# Patient Record
Sex: MALE | Race: BLACK OR AFRICAN AMERICAN | NOT HISPANIC OR LATINO
[De-identification: names, ages, dates, MRNs, and addresses within clinical notes are randomized per-mention and may not be internally consistent; named-entity substitution may affect disease eponyms.]

---

## 2019-08-16 PROBLEM — Z00.00 ENCOUNTER FOR PREVENTIVE HEALTH EXAMINATION: Status: ACTIVE | Noted: 2019-08-16

## 2019-09-09 ENCOUNTER — APPOINTMENT (OUTPATIENT)
Dept: NEUROSURGERY | Facility: CLINIC | Age: 64
End: 2019-09-09
Payer: OTHER MISCELLANEOUS

## 2019-09-09 ENCOUNTER — APPOINTMENT (OUTPATIENT)
Dept: NEUROSURGERY | Facility: CLINIC | Age: 64
End: 2019-09-09

## 2019-09-09 VITALS — HEIGHT: 71 IN | BODY MASS INDEX: 29.46 KG/M2 | WEIGHT: 210.44 LBS

## 2019-09-09 DIAGNOSIS — M54.2 CERVICALGIA: ICD-10-CM

## 2019-09-09 PROCEDURE — 99204 OFFICE O/P NEW MOD 45 MIN: CPT

## 2019-09-11 PROBLEM — M54.2 CERVICALGIA: Status: ACTIVE | Noted: 2019-09-11

## 2019-09-11 NOTE — PLAN
[FreeTextEntry1] : At this time Mr. Campa most significant complaint is regarding the right lumbar spine pain and spasm. It is limiting his mobility. While he has been participating in physical therapy I have encouraged him to do those exercises/stretching daily at home on his own as this will be very beneficial for his lumbago. I do no think surgery is necessary at this time. He will follow up as needed.

## 2019-09-11 NOTE — HISTORY OF PRESENT ILLNESS
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 He also has some neck pain. The pain occasionally radiates into the leg to the foot but for the majority of the time he experiences a constant stabbing pain in the right side of the low back. He denies any true weakness, bowel or bladder dysfunction. Despite being out of work since the accident the pain has not abated. MRI of the cervical and lumbar spine were reviewed. While he does have multi-level degenerative changes noted in both with some degree of bilateral multi-level foraminal stenosis, no significant central stenosis is noted.

## 2020-01-27 ENCOUNTER — APPOINTMENT (OUTPATIENT)
Dept: NEUROSURGERY | Facility: CLINIC | Age: 65
End: 2020-01-27

## 2020-03-23 ENCOUNTER — APPOINTMENT (OUTPATIENT)
Dept: NEUROSURGERY | Facility: CLINIC | Age: 65
End: 2020-03-23

## 2020-04-27 ENCOUNTER — APPOINTMENT (OUTPATIENT)
Dept: NEUROSURGERY | Facility: CLINIC | Age: 65
End: 2020-04-27

## 2020-05-18 ENCOUNTER — APPOINTMENT (OUTPATIENT)
Dept: NEUROSURGERY | Facility: CLINIC | Age: 65
End: 2020-05-18
Payer: OTHER MISCELLANEOUS

## 2020-05-18 DIAGNOSIS — M54.5 LOW BACK PAIN: ICD-10-CM

## 2020-05-18 PROCEDURE — G2012 BRIEF CHECK IN BY MD/QHP: CPT

## 2020-05-19 PROBLEM — M54.5 LUMBAGO: Status: ACTIVE | Noted: 2019-09-11

## 2020-05-19 NOTE — HISTORY OF PRESENT ILLNESS
[Verbal consent obtained from patient] : the patient, [unfilled] [FreeTextEntry1] : i am seeing Mr. Krishnamurthy in follow up. he does not have access to technology needed for video telehealth. mr. krishnamurthy has suffered significant left back pain radiating into the buttock since his injury. His pain was significantly helped with Pt and chiropractic care. In the last few months he has had worsening of his pain syndrome. He know has pain radiating into the left back and buttock but also now significant pain in the right back radiating into the back of the leg. he has known significant stenosis on MRI which was done two years ago. i am concerned he has had progression of this stenosis given new symptoms. he has not been able to continue PT as Workers comp has denied him. We have discussed surgical intervention in the past, however, given improvement with PT it is reasonable to continue that less invasive modality. he denies bowel or bladder dysfunction. he can only stand/walk short time/distance before he needs to rest due to pain and heaviness in the legs c/w claudication.

## 2020-08-26 ENCOUNTER — APPOINTMENT (OUTPATIENT)
Dept: NEUROSURGERY | Facility: CLINIC | Age: 65
End: 2020-08-26

## 2020-10-07 ENCOUNTER — APPOINTMENT (OUTPATIENT)
Dept: NEUROSURGERY | Facility: CLINIC | Age: 65
End: 2020-10-07

## 2020-10-14 ENCOUNTER — APPOINTMENT (OUTPATIENT)
Dept: NEUROSURGERY | Facility: CLINIC | Age: 65
End: 2020-10-14
Payer: OTHER MISCELLANEOUS

## 2020-10-14 VITALS — WEIGHT: 239 LBS | HEIGHT: 71 IN | BODY MASS INDEX: 33.46 KG/M2

## 2020-10-14 VITALS — HEIGHT: 71 IN | WEIGHT: 239 LBS | BODY MASS INDEX: 33.46 KG/M2

## 2020-10-14 DIAGNOSIS — Z78.9 OTHER SPECIFIED HEALTH STATUS: ICD-10-CM

## 2020-10-14 PROCEDURE — 99213 OFFICE O/P EST LOW 20 MIN: CPT

## 2020-10-14 NOTE — ASSESSMENT
[FreeTextEntry1] : Mr. Campa will  a new CD and the report of his recent MRI performed at [facility]. I will see him back on Monday 10/19/2020 at 10am to discuss treatment options. Patient is agreeable. \par \par Case and plan discussed with Dr. Mujica today.

## 2020-10-14 NOTE — PHYSICAL EXAM
[FreeTextEntry1] : Constitutional: Well appearing, no distress\par HEENT: Normocephalic Atraumatic\par Psychiatric: Alert and oriented to all spheres, normal mood\par Pulmonary: no respiratory distress\par Abdomen: non-distended\par Vascular/Extremities: no edema, no cyanosis, no clubbing\par \par \par Neurologic: \par CN II-XII grossly intact\par ROM: decreased in LS spine due to pain/spasm\par Palpation: significant pain to palpation right lumbar paraspinous muscles, multiple spasm points appreciated\par Strength: Full strength in all major muscle groups, no atrophy\par Sensation: Full sensation to light touch in all extremities\par Reflexes: \par  2+ patellar\par  2+ biceps\par  2+ ankle jerk\par  No Ron's\par  No clonus\par  No babinski\par \par Signs:\par SLR negative\par L'hermitte's negative\par \par Gait: toe, heel, tandem fluid

## 2020-10-14 NOTE — HISTORY OF PRESENT ILLNESS
[FreeTextEntry1] : Mr. Campa was involved in a work related injury on 3/11/2018. He continues to have persistent lower back pain with referred pain down the legs. He has had physical therapy which provides relief however treatments were stopped in August to the lack of authorization from  to proceed. He is under the care of Dr. Henry and Dr. Quiroz for ESIs and medical management. He is currently taking Fenoprofen Calcium 400 mg PRN. He had an updated MRI of the lumbar spine however, the report is not available for my review today and the CD Mr. Campa brought today was unable to be accessed. No bowel / bladder dysfunction.

## 2020-10-26 ENCOUNTER — APPOINTMENT (OUTPATIENT)
Dept: NEUROSURGERY | Facility: CLINIC | Age: 65
End: 2020-10-26
Payer: OTHER MISCELLANEOUS

## 2020-10-26 VITALS — BODY MASS INDEX: 33.46 KG/M2 | HEIGHT: 71 IN | WEIGHT: 239 LBS

## 2020-10-26 PROCEDURE — 99072 ADDL SUPL MATRL&STAF TM PHE: CPT

## 2020-10-26 PROCEDURE — 99214 OFFICE O/P EST MOD 30 MIN: CPT

## 2020-10-26 NOTE — HISTORY OF PRESENT ILLNESS
[FreeTextEntry1] : Mr. Campa was involved in a work related injury on 3/11/2018. He continues to have persistent lower back pain with intermittent pain into the legs. He has had physical therapy which provides relief however treatments were stopped in August to the lack of authorization from  to proceed. He recently started to workout at the gym again which has helped him. He is also working on weight reduction. No bowel / bladder dysfunction. Dr. Henry and Dr. Quiroz have given him TPIs with temporary relief. He is currently taking Fenoprofen Calcium 400 mg PRN. \par \par We have reviewed his recent MRI of the lumbar spine today. This was performed at [facility]. He has stenosis at L4/5 secondary to degenerative disc bulging / facet hypertrophy. \par \par  \par

## 2020-10-26 NOTE — ASSESSMENT
[FreeTextEntry1] : We had a thorough discussion regarding his condition and treatment options. I have recommended he trial L4/5 SUZANNE and Facet/MB block possible RFA for his pain. I will see him back in 8 weeks. If his pain continues despite conservative treatments then xrays with dynamic views will be ordered. He is agreeable.

## 2020-10-26 NOTE — PHYSICAL EXAM
[FreeTextEntry1] : Constitutional: Well appearing, no distress\par HEENT: Normocephalic Atraumatic\par Psychiatric: Alert and oriented to all spheres, normal mood\par Pulmonary: no respiratory distress\par Abdomen: non-distended\par Vascular/Extremities: no edema, no cyanosis, no clubbing\par \par \par Neurologic: \par CN II-XII grossly intact\par ROM: decreased in LS spine due to pain/spasm\par Palpation: significant pain to palpation right lumbar paraspinous muscles, multiple spasm points appreciated\par Strength: Full strength in all major muscle groups, no atrophy\par Sensation: Full sensation to light touch in all extremities\par Reflexes: \par  2+ patellar\par  2+ biceps\par  2+ ankle jerk\par  No Ron's\par  No clonus\par  No babinski\par \par Signs:\par SLR negative\par L'hermitte's negative\par \par Gait: toe, heel, tandem fluid. \par

## 2020-12-10 ENCOUNTER — APPOINTMENT (OUTPATIENT)
Dept: NEUROSURGERY | Facility: CLINIC | Age: 65
End: 2020-12-10

## 2021-01-25 ENCOUNTER — APPOINTMENT (OUTPATIENT)
Dept: NEUROSURGERY | Facility: CLINIC | Age: 66
End: 2021-01-25
Payer: OTHER MISCELLANEOUS

## 2021-01-25 VITALS — WEIGHT: 240 LBS | BODY MASS INDEX: 33.6 KG/M2 | HEIGHT: 71 IN

## 2021-01-25 VITALS — WEIGHT: 245 LBS | BODY MASS INDEX: 34.17 KG/M2

## 2021-01-25 PROCEDURE — 99214 OFFICE O/P EST MOD 30 MIN: CPT

## 2021-01-25 PROCEDURE — 99072 ADDL SUPL MATRL&STAF TM PHE: CPT

## 2021-01-25 NOTE — ASSESSMENT
[FreeTextEntry1] : We had a thorough discussion regarding his condition and treatment options. Last visit we discussed consulting with pain management again for additional injections. He would now like to proceed with an L4/5 SUZANNE and Facet/MB block possible RFA for his pain. He will also continue with the gym and weight reduction. I will see him back in 8 weeks. If his pain continues despite conservative treatments then xrays with dynamic views will be ordered. He is agreeable. \par

## 2021-01-25 NOTE — PHYSICAL EXAM
[FreeTextEntry1] : \par Constitutional: Well appearing, no distress\par HEENT: Normocephalic Atraumatic\par Psychiatric: Alert and oriented to all spheres, normal mood\par Pulmonary: no respiratory distress\par Abdomen: non-distended\par Vascular/Extremities: no edema, no cyanosis, no clubbing\par \par \par Neurologic: \par CN II-XII grossly intact\par ROM: decreased in LS spine due to pain/spasm\par Palpation: significant pain to palpation right lumbar paraspinous muscles, multiple spasm points appreciated\par Strength: Full strength in all major muscle groups, no atrophy\par Sensation: Full sensation to light touch in all extremities\par Reflexes: \par  2+ patellar\par  2+ biceps\par  2+ ankle jerk\par  No Ron's\par  No clonus\par  No babinski\par \par Signs:\par SLR negative\par L'hermitte's negative\par \par Gait: toe, heel, tandem fluid.

## 2021-01-25 NOTE — HISTORY OF PRESENT ILLNESS
[FreeTextEntry1] : Mr. Campa was involved in a work related injury on 3/11/2018. He continues to have persistent lower back pain with intermittent pain into the legs. He has had physical therapy which provides relief however treatments were stopped in August to the lack of authorization from  to proceed. He recently started to workout at the gym again. He is also working on weight reduction. No bowel / bladder dysfunction. Dr. Henry and Dr. Quiroz have given him TPIs with temporary relief. He is currently taking Fenoprofen Calcium 400 mg PRN. \par \par We have reviewed his recent MRI of the lumbar spine today. This was performed at [facility]. He has stenosis at L4/5 secondary to degenerative disc bulging / facet hypertrophy. \par \par  \par

## 2021-04-08 ENCOUNTER — APPOINTMENT (OUTPATIENT)
Dept: NEUROSURGERY | Facility: CLINIC | Age: 66
End: 2021-04-08
Payer: OTHER MISCELLANEOUS

## 2021-04-08 VITALS — WEIGHT: 240 LBS | BODY MASS INDEX: 33.6 KG/M2 | HEIGHT: 71 IN

## 2021-04-08 PROCEDURE — 99214 OFFICE O/P EST MOD 30 MIN: CPT

## 2021-04-08 PROCEDURE — 99072 ADDL SUPL MATRL&STAF TM PHE: CPT

## 2021-04-08 RX ORDER — HYDROCHLOROTHIAZIDE 12.5 MG/1
TABLET ORAL
Refills: 0 | Status: ACTIVE | COMMUNITY

## 2021-04-08 NOTE — PHYSICAL EXAM
[FreeTextEntry1] : Constitutional: Well appearing, no distress\par HEENT: Normocephalic Atraumatic\par Psychiatric: Alert and oriented to all spheres, normal mood\par Pulmonary: no respiratory distress\par Abdomen: non-distended\par Vascular/Extremities: no edema, no cyanosis, no clubbing\par \par Neurologic: \par CN II-XII grossly intact\par ROM: mild restriction. \par Palpation: no pain. \par Strength: Full strength in all major muscle groups, no atrophy\par Sensation: Full sensation to light touch in all extremities\par Reflexes: \par  2+ patellar\par  2+ biceps\par  2+ ankle jerk\par  No Ron's\par  No clonus\par  No babinski\par \par Signs:\par SLR negative\par L'hermitte's negative\par \par Gait: toe, heel, tandem fluid.

## 2021-04-08 NOTE — ASSESSMENT
[FreeTextEntry1] : Mr. Campa will continue with conservative management. I will see him back in 3 months. He will follow up with Dr. Henry as scheduled. He will continue to work on weight reduction and will start a home exercise program.

## 2021-04-08 NOTE — HISTORY OF PRESENT ILLNESS
[FreeTextEntry1] : Mr. Campa was involved in a work related injury on 3/11/2018. After the injury he developed lower back pain with intermittent pain into the legs. He had physical therapy and TPIs with some relief. Since his last visit he saw Dr. Henry and had an SUZANNE which has provided sustained relief. He is taking Fenoprofen PRN which has also helped. He saw his cardiologist who did a stress test and has cleared him to work out again. Weight reduction was encouraged and he has already lost 5 lbs. His pain scale today is a 2/10. He is happy with his progress. \par \par - MRI of the lumbar spine, [facility]: stenosis at L4/5 secondary to degenerative disc bulging / facet hypertrophy. \par

## 2021-07-26 ENCOUNTER — OUTPATIENT (OUTPATIENT)
Dept: OUTPATIENT SERVICES | Facility: HOSPITAL | Age: 66
LOS: 1 days | Discharge: HOME | End: 2021-07-26
Payer: MEDICARE

## 2021-07-26 DIAGNOSIS — E04.1 NONTOXIC SINGLE THYROID NODULE: ICD-10-CM

## 2021-07-26 PROCEDURE — 71046 X-RAY EXAM CHEST 2 VIEWS: CPT | Mod: 26

## 2021-07-26 PROCEDURE — 76536 US EXAM OF HEAD AND NECK: CPT | Mod: 26

## 2021-09-16 ENCOUNTER — APPOINTMENT (OUTPATIENT)
Dept: ENDOCRINOLOGY | Facility: CLINIC | Age: 66
End: 2021-09-16

## 2022-06-06 ENCOUNTER — APPOINTMENT (OUTPATIENT)
Age: 67
End: 2022-06-06
Payer: MEDICARE

## 2022-06-06 ENCOUNTER — NON-APPOINTMENT (OUTPATIENT)
Age: 67
End: 2022-06-06

## 2022-06-06 VITALS
OXYGEN SATURATION: 95 % | DIASTOLIC BLOOD PRESSURE: 60 MMHG | RESPIRATION RATE: 12 BRPM | HEIGHT: 71 IN | WEIGHT: 238 LBS | BODY MASS INDEX: 33.32 KG/M2 | SYSTOLIC BLOOD PRESSURE: 120 MMHG | HEART RATE: 81 BPM

## 2022-06-06 DIAGNOSIS — Z86.39 PERSONAL HISTORY OF OTHER ENDOCRINE, NUTRITIONAL AND METABOLIC DISEASE: ICD-10-CM

## 2022-06-06 DIAGNOSIS — J92.9 PLEURAL PLAQUE W/OUT ASBESTOS: ICD-10-CM

## 2022-06-06 DIAGNOSIS — F17.210 NICOTINE DEPENDENCE, CIGARETTES, UNCOMPLICATED: ICD-10-CM

## 2022-06-06 DIAGNOSIS — Z86.59 PERSONAL HISTORY OF OTHER MENTAL AND BEHAVIORAL DISORDERS: ICD-10-CM

## 2022-06-06 DIAGNOSIS — Z86.79 PERSONAL HISTORY OF OTHER DISEASES OF THE CIRCULATORY SYSTEM: ICD-10-CM

## 2022-06-06 DIAGNOSIS — J44.9 CHRONIC OBSTRUCTIVE PULMONARY DISEASE, UNSPECIFIED: ICD-10-CM

## 2022-06-06 DIAGNOSIS — G47.33 OBSTRUCTIVE SLEEP APNEA (ADULT) (PEDIATRIC): ICD-10-CM

## 2022-06-06 DIAGNOSIS — E11.9 TYPE 2 DIABETES MELLITUS W/OUT COMPLICATIONS: ICD-10-CM

## 2022-06-06 PROCEDURE — 99203 OFFICE O/P NEW LOW 30 MIN: CPT | Mod: 25

## 2022-06-06 PROCEDURE — 71046 X-RAY EXAM CHEST 2 VIEWS: CPT

## 2022-06-06 NOTE — DISCUSSION/SUMMARY
[FreeTextEntry1] : BIBASILAR PLEURAL THICKENING\par SMOKER\par PFT\par HIGHLY SUNITHA\par WEIGHT LOSS\par SMOKING COUNSELING\par TO CALL ME FOR RESULT OF CT

## 2022-06-06 NOTE — HISTORY OF PRESENT ILLNESS
[TextBox_4] : 67 YEARS OLD PRESENTED FOR ABOVE, SMOKER NOW 1 CIG/ DAY, HAD CHEST CT SCREENING FEW MONTH AGO, BIBASILAR PLEURAL THICKENING, REFERRED, NO IF OF LUNG DISEASE, SOB ON EXERTION, COUGH, NO RECURRENT BRONCHITIS\par EDS, ? SNORING\par

## 2022-09-21 ENCOUNTER — APPOINTMENT (OUTPATIENT)
Dept: HEMATOLOGY ONCOLOGY | Facility: CLINIC | Age: 67
End: 2022-09-21

## 2022-09-21 VITALS
WEIGHT: 253 LBS | HEIGHT: 71 IN | HEART RATE: 85 BPM | BODY MASS INDEX: 35.42 KG/M2 | DIASTOLIC BLOOD PRESSURE: 93 MMHG | TEMPERATURE: 97.3 F | RESPIRATION RATE: 20 BRPM | SYSTOLIC BLOOD PRESSURE: 173 MMHG

## 2022-10-18 ENCOUNTER — APPOINTMENT (OUTPATIENT)
Dept: PAIN MANAGEMENT | Facility: CLINIC | Age: 67
End: 2022-10-18

## 2022-10-18 VITALS — DIASTOLIC BLOOD PRESSURE: 89 MMHG | HEART RATE: 80 BPM | SYSTOLIC BLOOD PRESSURE: 156 MMHG

## 2022-10-18 DIAGNOSIS — Z72.0 TOBACCO USE: ICD-10-CM

## 2022-10-18 PROCEDURE — 99204 OFFICE O/P NEW MOD 45 MIN: CPT

## 2022-10-18 RX ORDER — ATORVASTATIN CALCIUM 10 MG/1
10 TABLET, FILM COATED ORAL
Refills: 0 | Status: ACTIVE | COMMUNITY

## 2022-10-18 RX ORDER — TAMSULOSIN HYDROCHLORIDE 0.4 MG/1
0.4 CAPSULE ORAL
Refills: 0 | Status: ACTIVE | COMMUNITY

## 2022-10-18 RX ORDER — FOSINOPRIL SODIUM 10 MG/1
10 TABLET ORAL
Refills: 0 | Status: ACTIVE | COMMUNITY

## 2022-10-19 NOTE — ASSESSMENT
[FreeTextEntry1] : This is a 67 year old male whose chief complaint is left lower back pain which was a result of a work related injury. There is no prior imaging for review today, therefore we will submit for a MRI lumbar spine as well as PT for his lumbar pain. In the meantime, I will prescibe Meloxicam 15mg once a day. We will follow up in 4 weeks to review imaging and for further evaluation.\par \par All this patients questions were answered and the conversation was understood well.\par \par Medication was prescibed today. \par \par Lumbar MRI with and without contrast was ordered to delineate spine pathology such as disc displacement and stenosis.\par \par Physical therapy of the lumbar spine 2-3 times a week for 4-8 weeks stressing a home exercise program of walking, shoulder girdle strengthening,  swimming, elliptical , recumbent bike, Amador chi and Yoga. Use things that heat like hot shower or icy heat before rehab, exercising and at the beginning of the day, and ice (ice in a bag never directly on the skin) after activity and at the end of the day.\par \par I, Rola Goldberg, attest that this documentation has been prepared under the direction and in the presence of Provider Penelope Mancilla MD.\par \par \par Thank you for allowing me to assist in the management of this patient. \par \par \par Best Regards, \par \par \par Penelope Mancilla M.D., FAAPMR\par \par \par Diplomate, American Board of Physical Medicine and Rehabilitation\par Diplomate, American Board of Pain Medicine \par Diplomate, American Board of Pain Management\par

## 2022-10-19 NOTE — DATA REVIEWED
[FreeTextEntry1] : SOAPP: \par \par UDS: No data obtained today\par \par [place] REGISTRY: Checked\par

## 2022-10-19 NOTE — PHYSICAL EXAM
[Normal Coordination] : normal coordination [Normal DTR UE/LE] : normal DTR UE/LE  [Normal Sensation] : normal sensation [Normal Mood and Affect] : normal mood and affect [Orientated] : orientated [Able to Communicate] : able to communicate [Well Developed] : well developed [Well Nourished] : well nourished [Flexion] : flexion [Extension] : extension [] : negative sitting straight leg raise [[id number]] : False [de-identified] : extension 0 degrees

## 2022-10-19 NOTE — HISTORY OF PRESENT ILLNESS
[FreeTextEntry1] : This is a 67-year-old male who presents today for a walk-in.  His chief complaint today is left lower back pain which was a result of a work related injury. 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 He was told he needed to rush and was provided no help. While attempting to unload and stack the skids, he states he felt a "pop" on the left side of his back. He was in so much pain that he fell to the side causing his left arm to hit into a pole. He did not seek immediate medical attention. He worked until June 4th, 2022, and he went to the ER at [facility] where he states he was treated and released and imaging was obtained which we do not have for our review. He also saw the doctor through his job, located in [place], a week after the incident again which we do not have for our review. He was not provided any medication and PT was denied by workers comp.  He denies any radicular symptoms.  Patient denies any prior history of lower back pain however he was seen by neurosurgery in the past for lower back pain.\par \par Patient rates his pain at a 7 1/2-8/10 today and states it is constant. He has had this pain for 5 months. He uses heat and cold treatments along with creams which provide him little relief. He denies any pain down the legs at this time. There is no prior imaging for review. He is interested in undergoing injections but was made aware that conservative treatments must be done first. \par

## 2022-10-19 NOTE — WORK
[Sprain/Strain] : sprain/strain [Was the competent medical cause of the injury] : was the competent medical cause of the injury [Are consistent with the injury] : are consistent with the injury [Consistent with my objective findings] : consistent with my objective findings [Total] : total [Cannot return to work because ________] : cannot return to work because [unfilled] [Unknown at this time] : : unknown at this time [Patient] : patient [No] : No [No Rx restrictions] : No Rx restrictions. [I provided the services listed above] :  I provided the services listed above. [Less than Sedentary Work:] :  Less than Sedentary Work: Unable to meet the requirement of Sedentary Work. [FreeTextEntry1] : good

## 2022-11-22 ENCOUNTER — APPOINTMENT (OUTPATIENT)
Dept: PAIN MANAGEMENT | Facility: CLINIC | Age: 67
End: 2022-11-22

## 2022-12-16 ENCOUNTER — APPOINTMENT (OUTPATIENT)
Dept: PAIN MANAGEMENT | Facility: CLINIC | Age: 67
End: 2022-12-16
Payer: SELF-PAY

## 2022-12-16 VITALS — HEIGHT: 71 IN | BODY MASS INDEX: 35.42 KG/M2 | WEIGHT: 253 LBS

## 2022-12-16 PROCEDURE — 99214 OFFICE O/P EST MOD 30 MIN: CPT

## 2022-12-19 NOTE — PHYSICAL EXAM
[Normal Coordination] : normal coordination [Normal DTR UE/LE] : normal DTR UE/LE  [Normal Sensation] : normal sensation [Normal Mood and Affect] : normal mood and affect [Orientated] : orientated [Able to Communicate] : able to communicate [Well Developed] : well developed [Well Nourished] : well nourished [Flexion] : flexion [Extension] : extension [] : negative sitting straight leg raise [de-identified] : extension 0 degrees

## 2022-12-19 NOTE — HISTORY OF PRESENT ILLNESS
[FreeTextEntry1] : ORIGINAL PRESENTATION: This is a 67-year-old male who presented to the walk-in on 10/18/22.  His chief complaint today is left lower back pain which was a result of a work related injury. 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 He was told he needed to rush and was provided no help. While attempting to unload and stack the skids, he states he felt a "pop" on the left side of his back. He was in so much pain that he fell to the side causing his left arm to hit into a pole. He did not seek immediate medical attention. He worked until June 4th, 2022, and he went to the ER at [facility] where he states he was treated and released and imaging was obtained which we do not have for our review. He also saw the doctor through his job, located in [place], a week after the incident again which we do not have for our review. He was not provided any medication and PT was denied by workers comp.  He denies any radicular symptoms.  Patient denies any prior history of lower back pain however he was seen by neurosurgery in the past for lower back pain.\par \par Patient rates his pain at a 7 1/2-8/10 today and states it is constant. He has had this pain for 5 months. He uses heat and cold treatments along with creams which provide him little relief. He denies any pain down the legs at this time. There is no prior imaging for review. He is interested in undergoing injections but was made aware that conservative treatments must be done first. \par \par The patient has had this pain for 6 months. The pain started after injury at work Patient describes pain as moderate to severe a nearly constant. During the last month the pain has been worse during the morning, evening. Pain described as burning, throbbing. Pain is decreased with sitting. Bowel or bladder habits have not changed.\par \par ACTIVITIES: Patient could walk less than 1 blocks before the pain starts. Patient can sit 3 hours before pain starts. The patient often lays down because of pain. Patient uses cane at this time. Patient has difficulty participating in recreational activities, exercise at this time.\par \par PRIOR PAIN TREATMENTS: Moderate relief with exercise, heat treatment, S psychotherapy.\par \par PRIOR PAIN MEDICATIONS: Meloxicam. \par \par PATIENT PRESENTS FOR FOLLOW UP: The reason for this visit is left lower back which was the result of a work related injury. Since his last visit, he completed an MRI of the lumbar spine which finds moderate central stenosis. He continues to experience pain in the left side of his back which is asymptomatic since there there are no radicular features noted. He states that on a good day he is able to stand for 1 hour but bad days only for 20 minutes. He is frustrated with the state of his pain because prior to the work incident he was able to be more physical. He has been going to the gym but has not started PT as of yet. He was made aware that his chronic arthritis was likely exacerbated by the injury he sustained at work.  \par \par Of note, he is also experiencing pain in the left arm that is a result of falling into a metal pole while stacking the skids.\par \par \par

## 2022-12-19 NOTE — DATA REVIEWED
[FreeTextEntry1] : MRI of the lumbar spine dated 11/25/22 finds Degenerative changes lumbar spine as described above level by level most significant at L4-5 where there is moderate central stenosis as well as moderate to severe bilateral facet arthropathy.  there is no evidence of severe central or severe foraminal narrowing throughout the lumbar spine levels. \par \par SOAPP: low on 12/16/22\par Low risk: Patient has combination of a low risk SOAP and no risk factors. UDS would be repeated randomly every quarter.\par \par UDS: No data obtained today\par \par [place] REGISTRY: Checked\par

## 2022-12-19 NOTE — ASSESSMENT
[FreeTextEntry1] : This is a 67 year old male whose chief complaint is left lower back pain which was a result of a work related injury. Patient underwent an MRI of the lumbar spine which was discussed in detial today. His chronic arthristis was likely exacerbated by the work injury. He is advised to begin trialing PT. He has been utilizing Meloxicam 15mg once a day which is providing him some relief. I will send a refill to the pharmacy and follow up in 6 weeks for see if the PT provides him with relief.. All this patients questions were answered and the conversation was understood well.\par \par Medication was refilled today. \par \par Physical therapy of the lumbar spine 2-3 times a week for 4-8 weeks stressing a home exercise program of walking, shoulder girdle strengthening,  swimming, elliptical , recumbent bike, Amador chi and Yoga. Use things that heat like hot shower or icy heat before rehab, exercising and at the beginning of the day, and ice (ice in a bag never directly on the skin) after activity and at the end of the day.\par \par \par I, Rola Goldberg, attest that this documentation has been prepared under the direction and in the presence of Provider Penelope Mancilla MD.\par \par \par Thank you for allowing me to assist in the management of this patient. \par \par \par Best Regards, \par \par \par Penelope Mancilla M.D., FAAPMR\par \par \par Diplomate, American Board of Physical Medicine and Rehabilitation\par Diplomate, American Board of Pain Medicine \par Diplomate, American Board of Pain Management\par

## 2022-12-20 ENCOUNTER — FORM ENCOUNTER (OUTPATIENT)
Age: 67
End: 2022-12-20

## 2023-01-19 ENCOUNTER — FORM ENCOUNTER (OUTPATIENT)
Age: 68
End: 2023-01-19

## 2023-02-03 ENCOUNTER — APPOINTMENT (OUTPATIENT)
Dept: PAIN MANAGEMENT | Facility: CLINIC | Age: 68
End: 2023-02-03
Payer: OTHER MISCELLANEOUS

## 2023-02-03 VITALS
HEART RATE: 80 BPM | HEIGHT: 71 IN | BODY MASS INDEX: 35.42 KG/M2 | SYSTOLIC BLOOD PRESSURE: 176 MMHG | WEIGHT: 253 LBS | DIASTOLIC BLOOD PRESSURE: 94 MMHG

## 2023-02-03 DIAGNOSIS — M54.50 LOW BACK PAIN, UNSPECIFIED: ICD-10-CM

## 2023-02-03 DIAGNOSIS — M46.97 UNSPECIFIED INFLAMMATORY SPONDYLOPATHY, LUMBOSACRAL REGION: ICD-10-CM

## 2023-02-03 PROCEDURE — 99214 OFFICE O/P EST MOD 30 MIN: CPT

## 2023-02-03 PROCEDURE — 99072 ADDL SUPL MATRL&STAF TM PHE: CPT

## 2023-02-03 NOTE — HISTORY OF PRESENT ILLNESS
[FreeTextEntry1] : ORIGINAL PRESENTATION: This is a 67-year-old male who presented to the walk-in on 10/18/22.  His chief complaint was of left lower back pain which was a result of a work related injury. 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 He was told he needed to rush and states he was provided no help. While attempting to unload and stack the skids, he states he felt a "pop" in the left side of his back. He was in so much pain that he fell to the side causing his left arm to hit into a pole. He did not seek immediate medical attention. He worked until June 4th, 2022, when he went to the ER at [facility] where he states he was treated and released and imaging was obtained which we do not have for our review. He also saw the doctor through his job, located in [place], a week after the incident again which we do not have for our review. He was not provided any medication and PT was denied by workers comp.  He denies any radicular symptoms.  Patient denies any prior history of lower back pain however he was seen by neurosurgery and pain management in the past for lower back pain which was as a result of a work injury in 2018.\par \par Patient rates his pain at a 7 1/2-8/10 today and states it is constant. He has had this pain for 5 months. He uses heat and cold treatments along with creams which provide him little relief. He denies any pain down the legs at this time. There is no prior imaging for review. He is interested in undergoing injections but was made aware that conservative treatments must be done first. \par \par The patient has had this pain for 6 months. The pain started after injury at work Patient describes pain as moderate to severe a nearly constant. During the last month the pain has been worse during the morning, evening. Pain described as burning, throbbing. Pain is decreased with sitting. Bowel or bladder habits have not changed.\par \par ACTIVITIES: Patient could walk less than 1 blocks before the pain starts. Patient can sit 3 hours before pain starts. The patient often lays down because of pain. Patient uses cane at this time. Patient has difficulty participating in recreational activities, exercise at this time.\par \par PRIOR PAIN TREATMENTS: Moderate relief with exercise, heat treatment, S psychotherapy.\par \par PRIOR PAIN MEDICATIONS: Meloxicam. \par \par PATIENT PRESENTS FOR FOLLOW UP: The reason for this visit is left lower back pain which was the result of a work-related injury. Since his last visit, patient reports an episode on Wednesday, 2/1/23, of what he believed to be a spasm. The pain started in the LT lower back, but he then began to feel a sharp pain radiate down his RT leg to the calf. Patient states episode lasted for 4 minutes, and he was unable to walk or use his leg for an additional 15 minutes. Patient has not begun PT as of yet because it was denied by his insurance. He states he is applying Icy Hot to the areas and using Epson salt baths to manage his symptoms. He has been doing home exercises. Patient has partial relief from the meloxicam.  PT once again denied by WC. He has not returned to work and states that he would not be able to lift at least 50 lbs. which is required at his job.\par \par

## 2023-02-03 NOTE — PHYSICAL EXAM
[Normal Coordination] : normal coordination [Normal DTR UE/LE] : normal DTR UE/LE  [Normal Sensation] : normal sensation [Normal Mood and Affect] : normal mood and affect [Orientated] : orientated [Able to Communicate] : able to communicate [Well Developed] : well developed [Well Nourished] : well nourished [Flexion] : flexion [Extension] : extension [] : negative sitting straight leg raise [TWNoteComboBox7] : forward flexion 45 degrees [de-identified] : extension 10 degrees

## 2023-02-03 NOTE — ASSESSMENT
[FreeTextEntry1] : This is a 67 year old male whose chief complaint is left lower back pain which was a result of a work related injury. Patient has not begun PT due to insurance denial but states he has been doing home exercise as conservative treatment. He will follow up with his  in regards to PT denial. He has been utilizing Meloxicam 15mg once a day which provides him some relief. I will send a refill to the pharmacy and follow up in 6 weeks for reassessment.  All this patients questions were answered and the conversation was understood well.\par \par Medication was refilled today. \par \par I, Andie Valenzuela, attest that this documentation has been prepared under the direction and in the presence of Provider Penelope Mancilla MD.\par \par \par Thank you for allowing me to assist in the management of this patient. \par \par \par Best Regards, \par \par \par Penelope Mancilla M.D., [facility]\par \par \par Diplomate, American Board of Physical Medicine and Rehabilitation\par Diplomate, American Board of Pain Medicine \par Diplomate, American Board of Pain Management\par

## 2023-02-03 NOTE — DATA REVIEWED
[FreeTextEntry1] : MRI of the lumbar spine dated 11/25/22 finds Degenerative changes lumbar spine as described above level by level most significant at L4-5 where there is moderate central stenosis as well as moderate to severe bilateral facet arthropathy.  there is no evidence of severe central or severe foraminal narrowing throughout the lumbar spine levels. \par \par SOAPP: low on 12/16/22\par Low risk: Patient has combination of a low risk SOAP and no risk factors. UDS would be repeated randomly every quarter.\par \par UDS: No data obtained today.\par \par [place] REGISTRY: Checked.\par

## 2023-03-03 ENCOUNTER — APPOINTMENT (OUTPATIENT)
Age: 68
End: 2023-03-03

## 2023-03-21 ENCOUNTER — APPOINTMENT (OUTPATIENT)
Dept: PAIN MANAGEMENT | Facility: CLINIC | Age: 68
End: 2023-03-21
Payer: OTHER MISCELLANEOUS

## 2023-03-21 VITALS
WEIGHT: 253 LBS | HEART RATE: 81 BPM | BODY MASS INDEX: 35.42 KG/M2 | HEIGHT: 71 IN | DIASTOLIC BLOOD PRESSURE: 92 MMHG | SYSTOLIC BLOOD PRESSURE: 138 MMHG

## 2023-03-21 VITALS — HEIGHT: 71 IN | BODY MASS INDEX: 35.42 KG/M2 | WEIGHT: 253 LBS

## 2023-03-21 DIAGNOSIS — M47.816 SPONDYLOSIS W/OUT MYELOPATHY OR RADICULOPATHY, LUMBAR REGION: ICD-10-CM

## 2023-03-21 DIAGNOSIS — M51.36 OTHER INTERVERTEBRAL DISC DEGENERATION, LUMBAR REGION: ICD-10-CM

## 2023-03-21 DIAGNOSIS — M48.061 SPINAL STENOSIS, LUMBAR REGION WITHOUT NEUROGENIC CLAUDICATION: ICD-10-CM

## 2023-03-21 PROCEDURE — 99213 OFFICE O/P EST LOW 20 MIN: CPT | Mod: ACP

## 2023-03-21 NOTE — ASSESSMENT
[FreeTextEntry1] : This is a 68 year old male whose chief complaint is left lower back pain which was a result of a work related injury. Patient has not begun PT due to insurance denial but states he has been doing home exercise as conservative treatment. He will follow up with his  in regards to PT denial. He has been utilizing Meloxicam 15mg once a day which provides him some relief. I will send a refill to the pharmacy and follow up in 8 weeks for reassessment.  All this patients questions were answered and the conversation was understood well.\par \par Medication was refilled today. \par \par Giacomo Guthrie PA-C\par Penelope Mancilla M.D., FAAPMR\par \par \par \par

## 2023-03-21 NOTE — WORK
[Sprain/Strain] : sprain/strain [Was the competent medical cause of the injury] : was the competent medical cause of the injury [Are consistent with the injury] : are consistent with the injury [Consistent with my objective findings] : consistent with my objective findings [Cannot return to work because ________] : cannot return to work because [unfilled] [Unknown at this time] : : unknown at this time [Patient] : patient [No] : No [No Rx restrictions] : No Rx restrictions. [I provided the services listed above] :  I provided the services listed above. [Less than Sedentary Work:] :  Less than Sedentary Work: Unable to meet the requirement of Sedentary Work. [FreeTextEntry1] : good

## 2023-03-21 NOTE — HISTORY OF PRESENT ILLNESS
[FreeTextEntry1] : ORIGINAL PRESENTATION: This is a 68-year-old male who presented to the walk-in on 10/18/22.  His chief complaint was of left lower back pain which was a result of a work related injury. 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 He was told he needed to rush and states he was provided no help. While attempting to unload and stack the skids, he states he felt a "pop" in the left side of his back. He was in so much pain that he fell to the side causing his left arm to hit into a pole. He did not seek immediate medical attention. He worked until June 4th, 2022, when he went to the ER at [facility] where he states he was treated and released and imaging was obtained which we do not have for our review. He also saw the doctor through his job, located in [place], a week after the incident again which we do not have for our review. He was not provided any medication and PT was denied by workers comp.  He denies any radicular symptoms.  Patient denies any prior history of lower back pain however he was seen by neurosurgery and pain management in the past for lower back pain which was as a result of a work injury in 2018.\par \par Patient rates his pain at a 7 1/2-8/10 today and states it is constant. He has had this pain for 5 months. He uses heat and cold treatments along with creams which provide him little relief. He denies any pain down the legs at this time. There is no prior imaging for review. He is interested in undergoing injections but was made aware that conservative treatments must be done first. \par \par The patient has had this pain for 6 months. The pain started after injury at work Patient describes pain as moderate to severe a nearly constant. During the last month the pain has been worse during the morning, evening. Pain described as burning, throbbing. Pain is decreased with sitting. Bowel or bladder habits have not changed.\par \par ACTIVITIES: Patient could walk less than 1 blocks before the pain starts. Patient can sit 3 hours before pain starts. The patient often lays down because of pain. Patient uses cane at this time. Patient has difficulty participating in recreational activities, exercise at this time.\par \par PRIOR PAIN TREATMENTS: Moderate relief with exercise, heat treatment, S psychotherapy.\par \par PRIOR PAIN MEDICATIONS: Meloxicam. \par \par PATIENT PRESENTS FOR FOLLOW UP: Last seen on 02/03/2023 and since then there has been no new complaints or acute changes.The reason for this visit is left lower back pain which was the result of a work-related injury. Since his last visit, patient reports an episode on Wednesday, 2/1/23, of what he believed to be a spasm. The pain started in the LT lower back, but he then began to feel a sharp pain radiate down his RT leg to the calf. Patient states episode lasted for 4 minutes, and he was unable to walk or use his leg for an additional 15 minutes. Patient has not begun PT as of yet because it was denied by his insurance. He states he is applying Icy Hot to the areas and using Epson salt baths to manage his symptoms. He has been doing home exercises. Patient has partial relief from the meloxicam.  PT once again denied by WC. He has not returned to work and states that he would not be able to lift at least 50 lbs. which is required at his job.\par \par

## 2023-03-21 NOTE — PHYSICAL EXAM
[Normal Coordination] : normal coordination [Normal DTR UE/LE] : normal DTR UE/LE  [Normal Sensation] : normal sensation [Normal Mood and Affect] : normal mood and affect [Orientated] : orientated [Able to Communicate] : able to communicate [Well Developed] : well developed [Well Nourished] : well nourished [Extension] : extension [Flexion] : flexion [] : negative sitting straight leg raise [TWNoteComboBox7] : forward flexion 45 degrees [de-identified] : extension 10 degrees

## 2023-06-20 ENCOUNTER — APPOINTMENT (OUTPATIENT)
Dept: PAIN MANAGEMENT | Facility: CLINIC | Age: 68
End: 2023-06-20

## 2023-08-21 ENCOUNTER — APPOINTMENT (OUTPATIENT)
Dept: PULMONOLOGY | Facility: CLINIC | Age: 68
End: 2023-08-21

## 2023-09-19 ENCOUNTER — RX RENEWAL (OUTPATIENT)
Age: 68
End: 2023-09-19

## 2023-09-19 RX ORDER — MELOXICAM 15 MG/1
15 TABLET ORAL DAILY
Qty: 90 | Refills: 3 | Status: ACTIVE | COMMUNITY
Start: 2022-10-18 | End: 1900-01-01

## 2023-10-03 ENCOUNTER — APPOINTMENT (OUTPATIENT)
Dept: PULMONOLOGY | Facility: CLINIC | Age: 68
End: 2023-10-03

## 2024-06-20 ENCOUNTER — NON-APPOINTMENT (OUTPATIENT)
Age: 69
End: 2024-06-20

## 2024-06-21 ENCOUNTER — APPOINTMENT (OUTPATIENT)
Dept: PULMONOLOGY | Facility: CLINIC | Age: 69
End: 2024-06-21

## 2024-09-04 ENCOUNTER — APPOINTMENT (OUTPATIENT)
Dept: PULMONOLOGY | Facility: CLINIC | Age: 69
End: 2024-09-04
Payer: MEDICARE

## 2024-09-04 VITALS
WEIGHT: 230 LBS | OXYGEN SATURATION: 97 % | SYSTOLIC BLOOD PRESSURE: 120 MMHG | DIASTOLIC BLOOD PRESSURE: 68 MMHG | BODY MASS INDEX: 32.2 KG/M2 | HEART RATE: 98 BPM | HEIGHT: 71 IN

## 2024-09-04 DIAGNOSIS — G47.33 OBSTRUCTIVE SLEEP APNEA (ADULT) (PEDIATRIC): ICD-10-CM

## 2024-09-04 DIAGNOSIS — J44.9 CHRONIC OBSTRUCTIVE PULMONARY DISEASE, UNSPECIFIED: ICD-10-CM

## 2024-09-04 PROCEDURE — G2211 COMPLEX E/M VISIT ADD ON: CPT

## 2024-09-04 PROCEDURE — 99213 OFFICE O/P EST LOW 20 MIN: CPT

## 2024-09-04 RX ORDER — FOSINOPRIL SODIUM 20 MG/1
20 TABLET ORAL
Refills: 0 | Status: ACTIVE | COMMUNITY

## 2024-09-04 RX ORDER — ALBUTEROL SULFATE 90 UG/1
108 (90 BASE) INHALANT RESPIRATORY (INHALATION) EVERY 4 HOURS
Qty: 6.7 | Refills: 5 | Status: ACTIVE | COMMUNITY
Start: 2024-09-04 | End: 1900-01-01

## 2024-09-04 RX ORDER — AMLODIPINE BESYLATE 10 MG/1
10 TABLET ORAL
Refills: 0 | Status: ACTIVE | COMMUNITY

## 2024-09-04 NOTE — DISCUSSION/SUMMARY
[FreeTextEntry1] : SMOKER// COPD/ PFT AS NEEDED INHALERS HIGHLY SUNITHA WEIGHT LOSS SMOKING COUNSELING CHEST CT REVIEWED

## 2024-09-13 ENCOUNTER — LABORATORY RESULT (OUTPATIENT)
Age: 69
End: 2024-09-13

## 2024-09-13 ENCOUNTER — OUTPATIENT (OUTPATIENT)
Dept: OUTPATIENT SERVICES | Facility: HOSPITAL | Age: 69
LOS: 1 days | Discharge: HOME | End: 2024-09-13
Payer: MEDICAID

## 2024-09-13 ENCOUNTER — APPOINTMENT (OUTPATIENT)
Age: 69
End: 2024-09-13
Payer: MEDICAID

## 2024-09-13 VITALS
RESPIRATION RATE: 16 BRPM | TEMPERATURE: 97.8 F | HEIGHT: 71.5 IN | OXYGEN SATURATION: 100 % | HEART RATE: 81 BPM | WEIGHT: 229 LBS | BODY MASS INDEX: 31.36 KG/M2 | SYSTOLIC BLOOD PRESSURE: 155 MMHG | DIASTOLIC BLOOD PRESSURE: 74 MMHG

## 2024-09-13 DIAGNOSIS — E11.9 TYPE 2 DIABETES MELLITUS W/OUT COMPLICATIONS: ICD-10-CM

## 2024-09-13 DIAGNOSIS — D64.9 ANEMIA, UNSPECIFIED: ICD-10-CM

## 2024-09-13 DIAGNOSIS — Z63.5 DISRUPTION OF FAMILY BY SEPARATION AND DIVORCE: ICD-10-CM

## 2024-09-13 DIAGNOSIS — I10 ESSENTIAL (PRIMARY) HYPERTENSION: ICD-10-CM

## 2024-09-13 DIAGNOSIS — N40.0 BENIGN PROSTATIC HYPERPLASIA WITHOUT LOWER URINARY TRACT SYMPMS: ICD-10-CM

## 2024-09-13 LAB
ALBUMIN SERPL ELPH-MCNC: 4.1 G/DL
ALP BLD-CCNC: 172 U/L
ALT SERPL-CCNC: 19 U/L
ANION GAP SERPL CALC-SCNC: 11 MMOL/L
AST SERPL-CCNC: 25 U/L
BILIRUB SERPL-MCNC: 0.2 MG/DL
BUN SERPL-MCNC: 15 MG/DL
CALCIUM SERPL-MCNC: 9.1 MG/DL
CHLORIDE SERPL-SCNC: 104 MMOL/L
CO2 SERPL-SCNC: 25 MMOL/L
CREAT SERPL-MCNC: 0.8 MG/DL
EGFR: 95 ML/MIN/1.73M2
GLUCOSE SERPL-MCNC: 134 MG/DL
HCT VFR BLD CALC: 39 %
HGB BLD-MCNC: 12.7 G/DL
LDH SERPL-CCNC: 186 U/L
MCHC RBC-ENTMCNC: 27.2 PG
MCHC RBC-ENTMCNC: 32.6 G/DL
MCV RBC AUTO: 83.5 FL
PLATELET # BLD AUTO: 295 K/UL
PMV BLD: 9.2 FL
POTASSIUM SERPL-SCNC: 4.3 MMOL/L
PROT SERPL-MCNC: 6.5 G/DL
RBC # BLD: 4.67 M/UL
RBC # FLD: 14.9 %
RETICS # AUTO: 1 %
RETICS AGGREG/RBC NFR: 47.2 K/UL
SODIUM SERPL-SCNC: 140 MMOL/L
WBC # FLD AUTO: 7.87 K/UL

## 2024-09-13 PROCEDURE — 83655 ASSAY OF LEAD: CPT

## 2024-09-13 PROCEDURE — 85027 COMPLETE CBC AUTOMATED: CPT

## 2024-09-13 PROCEDURE — 88189 FLOWCYTOMETRY/READ 16 & >: CPT | Mod: 59

## 2024-09-13 PROCEDURE — 87205 SMEAR GRAM STAIN: CPT

## 2024-09-13 PROCEDURE — 86334 IMMUNOFIX E-PHORESIS SERUM: CPT

## 2024-09-13 PROCEDURE — 99204 OFFICE O/P NEW MOD 45 MIN: CPT

## 2024-09-13 PROCEDURE — 82525 ASSAY OF COPPER: CPT

## 2024-09-13 PROCEDURE — 88185 FLOWCYTOMETRY/TC ADD-ON: CPT

## 2024-09-13 PROCEDURE — 85046 RETICYTE/HGB CONCENTRATE: CPT

## 2024-09-13 PROCEDURE — 88184 FLOWCYTOMETRY/ TC 1 MARKER: CPT

## 2024-09-13 PROCEDURE — 86038 ANTINUCLEAR ANTIBODIES: CPT

## 2024-09-13 PROCEDURE — 84155 ASSAY OF PROTEIN SERUM: CPT

## 2024-09-13 PROCEDURE — 86431 RHEUMATOID FACTOR QUANT: CPT

## 2024-09-13 PROCEDURE — 83615 LACTATE (LD) (LDH) ENZYME: CPT

## 2024-09-13 PROCEDURE — 80053 COMPREHEN METABOLIC PANEL: CPT

## 2024-09-13 PROCEDURE — 84165 PROTEIN E-PHORESIS SERUM: CPT

## 2024-09-13 SDOH — SOCIAL STABILITY - SOCIAL INSECURITY: DISRUPTION OF FAMILY BY SEPARATION AND DIVORCE: Z63.5

## 2024-09-14 DIAGNOSIS — D64.9 ANEMIA, UNSPECIFIED: ICD-10-CM

## 2024-09-16 LAB
LEAD BLD-MCNC: 1 UG/DL
RHEUMATOID FACT SER QL: 14 IU/ML

## 2024-09-18 LAB
ALBUMIN MFR SERPL ELPH: 58.9 %
ALBUMIN SERPL-MCNC: 3.8 G/DL
ALBUMIN/GLOB SERPL: 1.4 RATIO
ALPHA1 GLOB MFR SERPL ELPH: 4.7 %
ALPHA1 GLOB SERPL ELPH-MCNC: 0.3 G/DL
ALPHA2 GLOB MFR SERPL ELPH: 12.1 %
ALPHA2 GLOB SERPL ELPH-MCNC: 0.8 G/DL
ANA SER IF-ACNC: NEGATIVE
B-GLOBULIN MFR SERPL ELPH: 12.2 %
B-GLOBULIN SERPL ELPH-MCNC: 0.8 G/DL
COPPER SERPL-MCNC: 95 UG/DL
GAMMA GLOB FLD ELPH-MCNC: 0.8 G/DL
GAMMA GLOB MFR SERPL ELPH: 12.1 %
INTERPRETATION SERPL IEP-IMP: NORMAL
M PROTEIN SPEC IFE-MCNC: NORMAL
PROT SERPL-MCNC: 6.5 G/DL
PROT SERPL-MCNC: 6.5 G/DL

## 2024-09-21 NOTE — ASSESSMENT
[FreeTextEntry1] : 1. Anemia, normocytic, of unclear etiology at this time. No evidence of bleeding. His chemistries have been within normal limits except for his testosterone level (low) which could be the reason of the mild anemia, however, that's a diagnosis of exclusion. 2. Stable pulmonary nodules and pleural thickening (considered "benign appearance"). 3. Mild lymphocytosis,   The situation was discussed with the patient. Will do further workup. Will repeat the CBC and obtain reticulocyte count, flow cytometry, CMP, LDH, SPEP with IFES, RA and HENNY.  Further recommendations, which may include repeating the testosterone level, after the above completed.  All questions answered.

## 2024-09-21 NOTE — HISTORY OF PRESENT ILLNESS
[Disease:__________________________] : Disease: [unfilled] [Date: ____________] : Patient's last distress assessment performed on [unfilled]. [0 - No Distress] : Distress Level: 0 [ECOG Performance Status: 0 - Fully active, able to carry on all pre-disease performance without restriction] : Performance Status: 0 - Fully active, able to carry on all pre-disease performance without restriction [de-identified] : The patient is a 69-year-old AA male who was referred by his primary care physician, Dr. Riojas, for hematological evaluation. The patient was not sure of the exact reason for the referral. He was under the impression it could have been because of his lung nodules; however, for those, he is followed by his pulmonary specialist.  Reviewing his blood results, it is noted that he has been mildly anemic with a Hgb of 12.9. His WBC was within normal limits at 9.21 with neutrophiles at 43.1% (absolute count 3.97) and lymphocyte of 45.1% (absolute count 4.15). His platelets have been within normal at 243 K. MCV was 86.7 with RDW of 15.2. The patient is denying any particular, specific symptoms. He states that he is a smoker since the 90's.  Further questioning reveals that he has "congestion in his throat". No difficulty swallowing. However, he has saliva build up forcing him to spit out. Also, when he picks up a cigarette and smokes, he feels burning sensation in the upper chest.  He states that he has lost weight by dieting and exercising, almost 20 lbs, but has regained almost 5 lbs back. No fever or night sweats. No new pains or aches but has chronic back problems from being hurt at work ([employer] ).  Reviewing his lab results from July 2021, his Hgb was 11.5 with a normal MCV, with slight lymphocytosis (absolute count 3.94 with monocytes at 1.06. Chemistries were within normal limits)

## 2024-09-21 NOTE — PHYSICAL EXAM
[Fully active, able to carry on all pre-disease performance without restriction] : Status 0 - Fully active, able to carry on all pre-disease performance without restriction [Normal] : affect appropriate [de-identified] : But somewhat overweight [de-identified] : Midline abdominal wall hernia

## 2024-09-21 NOTE — HISTORY OF PRESENT ILLNESS
[Disease:__________________________] : Disease: [unfilled] [Date: ____________] : Patient's last distress assessment performed on [unfilled]. [0 - No Distress] : Distress Level: 0 [ECOG Performance Status: 0 - Fully active, able to carry on all pre-disease performance without restriction] : Performance Status: 0 - Fully active, able to carry on all pre-disease performance without restriction [de-identified] : The patient is a 69-year-old AA male who was referred by his primary care physician, Dr. Riojas, for hematological evaluation. The patient was not sure of the exact reason for the referral. He was under the impression it could have been because of his lung nodules; however, for those, he is followed by his pulmonary specialist.  Reviewing his blood results, it is noted that he has been mildly anemic with a Hgb of 12.9. His WBC was within normal limits at 9.21 with neutrophiles at 43.1% (absolute count 3.97) and lymphocyte of 45.1% (absolute count 4.15). His platelets have been within normal at 243 K. MCV was 86.7 with RDW of 15.2. The patient is denying any particular, specific symptoms. He states that he is a smoker since the 90's.  Further questioning reveals that he has "congestion in his throat". No difficulty swallowing. However, he has saliva build up forcing him to spit out. Also, when he picks up a cigarette and smokes, he feels burning sensation in the upper chest.  He states that he has lost weight by dieting and exercising, almost 20 lbs, but has regained almost 5 lbs back. No fever or night sweats. No new pains or aches but has chronic back problems from being hurt at work ([employer] ).  Reviewing his lab results from July 2021, his Hgb was 11.5 with a normal MCV, with slight lymphocytosis (absolute count 3.94 with monocytes at 1.06. Chemistries were within normal limits)

## 2024-09-21 NOTE — REVIEW OF SYSTEMS
[Recent Change In Weight] : ~T recent weight change [Loss of Hearing] : loss of hearing [Joint Pain] : joint pain [Insomnia] : insomnia [Negative] : Heme/Lymph [FreeTextEntry2] : Lost 30 lbs by dieting but regained 5 lbs [FreeTextEntry4] : Right ear [FreeTextEntry9] : Right shoulder after weightlifting [de-identified] : Wakes up after 5-6 hours then goes back to sleep.

## 2024-09-21 NOTE — RESULTS/DATA
[FreeTextEntry1] : The CBC today shows a WBC of 7.87, H/H 39%/12.7, platelets 295K, MCV 83.5. Back in August 2021, his free testosterone level was 6.3 (lower limit 6.6) and the total testosterone was 234 (lower limit of normal 264.

## 2024-09-21 NOTE — PHYSICAL EXAM
[Fully active, able to carry on all pre-disease performance without restriction] : Status 0 - Fully active, able to carry on all pre-disease performance without restriction [Normal] : affect appropriate [de-identified] : But somewhat overweight [de-identified] : Midline abdominal wall hernia

## 2024-09-21 NOTE — PHYSICAL EXAM
[Fully active, able to carry on all pre-disease performance without restriction] : Status 0 - Fully active, able to carry on all pre-disease performance without restriction [Normal] : affect appropriate [de-identified] : But somewhat overweight [de-identified] : Midline abdominal wall hernia

## 2024-09-21 NOTE — REVIEW OF SYSTEMS
[Recent Change In Weight] : ~T recent weight change [Loss of Hearing] : loss of hearing [Joint Pain] : joint pain [Insomnia] : insomnia [Negative] : Heme/Lymph [FreeTextEntry2] : Lost 30 lbs by dieting but regained 5 lbs [FreeTextEntry4] : Right ear [FreeTextEntry9] : Right shoulder after weightlifting [de-identified] : Wakes up after 5-6 hours then goes back to sleep.

## 2024-09-21 NOTE — CONSULT LETTER
[Dear  ___] : Dear  [unfilled], [Consult Letter:] : I had the pleasure of evaluating your patient, [unfilled]. [Consult Closing:] : Thank you very much for allowing me to participate in the care of this patient.  If you have any questions, please do not hesitate to contact me. [Please see my note below.] : Please see my note below. [Sincerely,] : Sincerely, [FreeTextEntry3] : Dr. EARLE Felix

## 2024-09-21 NOTE — REVIEW OF SYSTEMS
[Recent Change In Weight] : ~T recent weight change [Loss of Hearing] : loss of hearing [Joint Pain] : joint pain [Insomnia] : insomnia [Negative] : Heme/Lymph [FreeTextEntry2] : Lost 30 lbs by dieting but regained 5 lbs [FreeTextEntry4] : Right ear [FreeTextEntry9] : Right shoulder after weightlifting [de-identified] : Wakes up after 5-6 hours then goes back to sleep.

## 2024-09-21 NOTE — CONSULT LETTER
[Dear  ___] : Dear  [unfilled], [Consult Letter:] : I had the pleasure of evaluating your patient, [unfilled]. [Please see my note below.] : Please see my note below. [Consult Closing:] : Thank you very much for allowing me to participate in the care of this patient.  If you have any questions, please do not hesitate to contact me. [Sincerely,] : Sincerely, [FreeTextEntry3] : Dr. EARLE Felix

## 2024-11-25 ENCOUNTER — OUTPATIENT (OUTPATIENT)
Dept: OUTPATIENT SERVICES | Facility: HOSPITAL | Age: 69
LOS: 1 days | End: 2024-11-25
Payer: MEDICARE

## 2024-11-25 ENCOUNTER — OUTPATIENT (OUTPATIENT)
Dept: OUTPATIENT SERVICES | Facility: HOSPITAL | Age: 69
LOS: 1 days | End: 2024-11-25

## 2024-11-25 ENCOUNTER — APPOINTMENT (OUTPATIENT)
Age: 69
End: 2024-11-25

## 2024-11-25 ENCOUNTER — LABORATORY RESULT (OUTPATIENT)
Age: 69
End: 2024-11-25

## 2024-11-25 VITALS
SYSTOLIC BLOOD PRESSURE: 142 MMHG | DIASTOLIC BLOOD PRESSURE: 82 MMHG | RESPIRATION RATE: 16 BRPM | HEART RATE: 83 BPM | HEIGHT: 71.5 IN | BODY MASS INDEX: 32.04 KG/M2 | OXYGEN SATURATION: 99 % | TEMPERATURE: 97.5 F | WEIGHT: 234 LBS

## 2024-11-25 DIAGNOSIS — D64.9 ANEMIA, UNSPECIFIED: ICD-10-CM

## 2024-11-25 LAB
ALBUMIN SERPL ELPH-MCNC: 4.4 G/DL
ALP BLD-CCNC: 170 U/L
ALT SERPL-CCNC: 19 U/L
ANION GAP SERPL CALC-SCNC: 12 MMOL/L
AST SERPL-CCNC: 24 U/L
BILIRUB SERPL-MCNC: 0.4 MG/DL
BUN SERPL-MCNC: 12 MG/DL
CALCIUM SERPL-MCNC: 9.5 MG/DL
CHLORIDE SERPL-SCNC: 104 MMOL/L
CO2 SERPL-SCNC: 26 MMOL/L
CREAT SERPL-MCNC: 0.8 MG/DL
EGFR: 95 ML/MIN/1.73M2
GLUCOSE SERPL-MCNC: 109 MG/DL
HCT VFR BLD CALC: 40.8 %
HGB BLD-MCNC: 13.4 G/DL
LDH SERPL-CCNC: 196 U/L
MCHC RBC-ENTMCNC: 27.4 PG
MCHC RBC-ENTMCNC: 32.8 G/DL
MCV RBC AUTO: 83.4 FL
PLATELET # BLD AUTO: 307 K/UL
PMV BLD: 8.7 FL
POTASSIUM SERPL-SCNC: 4.7 MMOL/L
PROT SERPL-MCNC: 7.1 G/DL
RBC # BLD: 4.89 M/UL
RBC # FLD: 14.8 %
SODIUM SERPL-SCNC: 142 MMOL/L
WBC # FLD AUTO: 8.14 K/UL

## 2024-11-25 PROCEDURE — 88185 FLOWCYTOMETRY/TC ADD-ON: CPT

## 2024-11-25 PROCEDURE — 88184 FLOWCYTOMETRY/ TC 1 MARKER: CPT

## 2024-11-25 PROCEDURE — 87205 SMEAR GRAM STAIN: CPT

## 2024-11-25 PROCEDURE — 83615 LACTATE (LD) (LDH) ENZYME: CPT

## 2024-11-25 PROCEDURE — 80053 COMPREHEN METABOLIC PANEL: CPT

## 2024-11-25 PROCEDURE — 85027 COMPLETE CBC AUTOMATED: CPT

## 2024-11-26 DIAGNOSIS — D64.9 ANEMIA, UNSPECIFIED: ICD-10-CM

## 2024-11-29 ENCOUNTER — APPOINTMENT (OUTPATIENT)
Age: 69
End: 2024-11-29

## 2024-12-04 ENCOUNTER — APPOINTMENT (OUTPATIENT)
Dept: PULMONOLOGY | Facility: CLINIC | Age: 69
End: 2024-12-04

## 2024-12-04 DIAGNOSIS — D64.9 ANEMIA, UNSPECIFIED: ICD-10-CM

## 2025-01-02 DIAGNOSIS — R74.8 ABNORMAL LEVELS OF OTHER SERUM ENZYMES: ICD-10-CM

## 2025-01-10 ENCOUNTER — APPOINTMENT (OUTPATIENT)
Dept: UROLOGY | Facility: CLINIC | Age: 70
End: 2025-01-10
Payer: MEDICARE

## 2025-01-10 VITALS
OXYGEN SATURATION: 96 % | WEIGHT: 225 LBS | HEART RATE: 103 BPM | HEIGHT: 71 IN | SYSTOLIC BLOOD PRESSURE: 148 MMHG | TEMPERATURE: 98 F | RESPIRATION RATE: 18 BRPM | BODY MASS INDEX: 31.5 KG/M2 | DIASTOLIC BLOOD PRESSURE: 80 MMHG

## 2025-01-10 DIAGNOSIS — R35.0 FREQUENCY OF MICTURITION: ICD-10-CM

## 2025-01-10 DIAGNOSIS — M54.50 LOW BACK PAIN, UNSPECIFIED: ICD-10-CM

## 2025-01-10 DIAGNOSIS — M48.061 SPINAL STENOSIS, LUMBAR REGION WITHOUT NEUROGENIC CLAUDICATION: ICD-10-CM

## 2025-01-10 DIAGNOSIS — N40.1 BENIGN PROSTATIC HYPERPLASIA WITH LOWER URINARY TRACT SYMPMS: ICD-10-CM

## 2025-01-10 DIAGNOSIS — E11.9 TYPE 2 DIABETES MELLITUS W/OUT COMPLICATIONS: ICD-10-CM

## 2025-01-10 DIAGNOSIS — N13.8 BENIGN PROSTATIC HYPERPLASIA WITH LOWER URINARY TRACT SYMPMS: ICD-10-CM

## 2025-01-10 DIAGNOSIS — N52.9 MALE ERECTILE DYSFUNCTION, UNSPECIFIED: ICD-10-CM

## 2025-01-10 PROCEDURE — 81003 URINALYSIS AUTO W/O SCOPE: CPT | Mod: QW

## 2025-01-10 PROCEDURE — G2211 COMPLEX E/M VISIT ADD ON: CPT

## 2025-01-10 PROCEDURE — 99204 OFFICE O/P NEW MOD 45 MIN: CPT

## 2025-01-10 RX ORDER — SILDENAFIL 100 MG/1
100 TABLET, FILM COATED ORAL
Qty: 10 | Refills: 3 | Status: ACTIVE | COMMUNITY
Start: 2025-01-10 | End: 1900-01-01

## 2025-01-16 PROBLEM — N40.1 BPH WITH OBSTRUCTION/LOWER URINARY TRACT SYMPTOMS: Status: ACTIVE | Noted: 2025-01-16

## 2025-01-16 PROBLEM — M54.50 LUMBAGO: Status: ACTIVE | Noted: 2019-09-11

## 2025-01-16 PROBLEM — R35.0 FREQUENCY OF MICTURITION: Status: ACTIVE | Noted: 2025-01-16

## 2025-01-16 LAB
BILIRUB UR QL STRIP: NORMAL
COLLECTION METHOD: NORMAL
GLUCOSE UR-MCNC: NORMAL
HCG UR QL: 0.2 EU/DL
HGB UR QL STRIP.AUTO: NORMAL
KETONES UR-MCNC: NORMAL
LEUKOCYTE ESTERASE UR QL STRIP: NORMAL
NITRITE UR QL STRIP: NORMAL
PH UR STRIP: 6
PROT UR STRIP-MCNC: NORMAL
PSA SERPL-MCNC: 0.82 NG/ML
SP GR UR STRIP: 1.01

## 2025-03-07 ENCOUNTER — APPOINTMENT (OUTPATIENT)
Dept: UROLOGY | Facility: CLINIC | Age: 70
End: 2025-03-07

## 2025-03-14 ENCOUNTER — APPOINTMENT (OUTPATIENT)
Dept: UROLOGY | Facility: CLINIC | Age: 70
End: 2025-03-14
Payer: MEDICARE

## 2025-03-14 VITALS
BODY MASS INDEX: 32.62 KG/M2 | SYSTOLIC BLOOD PRESSURE: 144 MMHG | DIASTOLIC BLOOD PRESSURE: 81 MMHG | HEIGHT: 71 IN | HEART RATE: 77 BPM | TEMPERATURE: 98.1 F | WEIGHT: 233 LBS | OXYGEN SATURATION: 96 %

## 2025-03-14 DIAGNOSIS — N52.9 MALE ERECTILE DYSFUNCTION, UNSPECIFIED: ICD-10-CM

## 2025-03-14 DIAGNOSIS — E29.1 TESTICULAR HYPOFUNCTION: ICD-10-CM

## 2025-03-14 PROCEDURE — G2211 COMPLEX E/M VISIT ADD ON: CPT

## 2025-03-14 PROCEDURE — 99214 OFFICE O/P EST MOD 30 MIN: CPT

## 2025-03-31 ENCOUNTER — LABORATORY RESULT (OUTPATIENT)
Age: 70
End: 2025-03-31

## 2025-03-31 ENCOUNTER — APPOINTMENT (OUTPATIENT)
Age: 70
End: 2025-03-31
Payer: MEDICARE

## 2025-03-31 ENCOUNTER — OUTPATIENT (OUTPATIENT)
Dept: OUTPATIENT SERVICES | Facility: HOSPITAL | Age: 70
LOS: 1 days | End: 2025-03-31
Payer: MEDICARE

## 2025-03-31 VITALS
SYSTOLIC BLOOD PRESSURE: 136 MMHG | HEIGHT: 71 IN | TEMPERATURE: 97.7 F | BODY MASS INDEX: 32.62 KG/M2 | HEART RATE: 79 BPM | DIASTOLIC BLOOD PRESSURE: 83 MMHG | OXYGEN SATURATION: 99 % | WEIGHT: 233 LBS

## 2025-03-31 DIAGNOSIS — D64.9 ANEMIA, UNSPECIFIED: ICD-10-CM

## 2025-03-31 DIAGNOSIS — R89.9 UNSPECIFIED ABNORMAL FINDING IN SPECIMENS FROM OTHER ORGANS, SYSTEMS AND TISSUES: ICD-10-CM

## 2025-03-31 LAB
ALBUMIN SERPL ELPH-MCNC: 4.4 G/DL
ALP BLD-CCNC: 169 U/L
ALT SERPL-CCNC: 51 U/L
ANION GAP SERPL CALC-SCNC: 12 MMOL/L
AST SERPL-CCNC: 39 U/L
AUTO BASOPHILS #: 0.02 K/UL
AUTO BASOPHILS %: 0.3 %
AUTO EOSINOPHILS #: 0.12 K/UL
AUTO EOSINOPHILS %: 1.7 %
AUTO IMMATURE GRANULOCYTES #: 0.01 K/UL
AUTO LYMPHOCYTES #: 3.15 K/UL
AUTO LYMPHOCYTES %: 45.8 %
AUTO MONOCYTES #: 0.75 K/UL
AUTO MONOCYTES %: 10.9 %
AUTO NEUTROPHILS #: 2.83 K/UL
AUTO NEUTROPHILS %: 41.2 %
AUTO NRBC #: 0 K/UL
BILIRUB SERPL-MCNC: 0.5 MG/DL
BUN SERPL-MCNC: 10 MG/DL
CALCIUM SERPL-MCNC: 9.3 MG/DL
CHLORIDE SERPL-SCNC: 104 MMOL/L
CO2 SERPL-SCNC: 27 MMOL/L
CREAT SERPL-MCNC: 0.8 MG/DL
CRP SERPL-MCNC: 3.1 MG/L
EGFRCR SERPLBLD CKD-EPI 2021: 95 ML/MIN/1.73M2
ERYTHROCYTE [SEDIMENTATION RATE] IN BLOOD BY WESTERGREN METHOD: 38 MM/HR
GLUCOSE SERPL-MCNC: 99 MG/DL
HCT VFR BLD CALC: 40 %
HGB BLD-MCNC: 13.3 G/DL
IMM GRANULOCYTES NFR BLD AUTO: 0.1 %
LDH SERPL-CCNC: 212 U/L
MAN DIFF?: NORMAL
MCHC RBC-ENTMCNC: 27.4 PG
MCHC RBC-ENTMCNC: 33.3 G/DL
MCV RBC AUTO: 82.5 FL
PLATELET # BLD AUTO: 309 K/UL
PMV BLD AUTO: 0 /100 WBCS
PMV BLD: 9.3 FL
POTASSIUM SERPL-SCNC: 5 MMOL/L
PROT SERPL-MCNC: 6.6 G/DL
RBC # BLD: 4.85 M/UL
RBC # FLD: 15.1 %
SODIUM SERPL-SCNC: 143 MMOL/L
WBC # FLD AUTO: 6.88 K/UL

## 2025-03-31 PROCEDURE — 88189 FLOWCYTOMETRY/READ 16 & >: CPT | Mod: 59

## 2025-03-31 PROCEDURE — 85045 AUTOMATED RETICULOCYTE COUNT: CPT

## 2025-03-31 PROCEDURE — 99204 OFFICE O/P NEW MOD 45 MIN: CPT

## 2025-03-31 PROCEDURE — 86360 T CELL ABSOLUTE COUNT/RATIO: CPT

## 2025-03-31 PROCEDURE — 86140 C-REACTIVE PROTEIN: CPT

## 2025-03-31 PROCEDURE — 82728 ASSAY OF FERRITIN: CPT

## 2025-03-31 PROCEDURE — 85652 RBC SED RATE AUTOMATED: CPT

## 2025-03-31 PROCEDURE — 86663 EPSTEIN-BARR ANTIBODY: CPT

## 2025-03-31 PROCEDURE — 83615 LACTATE (LD) (LDH) ENZYME: CPT

## 2025-03-31 PROCEDURE — 86664 EPSTEIN-BARR NUCLEAR ANTIGEN: CPT

## 2025-03-31 PROCEDURE — 87205 SMEAR GRAM STAIN: CPT

## 2025-03-31 PROCEDURE — 86359 T CELLS TOTAL COUNT: CPT

## 2025-03-31 PROCEDURE — 82784 ASSAY IGA/IGD/IGG/IGM EACH: CPT

## 2025-03-31 PROCEDURE — 88184 FLOWCYTOMETRY/ TC 1 MARKER: CPT

## 2025-03-31 PROCEDURE — 86645 CMV ANTIBODY IGM: CPT

## 2025-03-31 PROCEDURE — 88185 FLOWCYTOMETRY/TC ADD-ON: CPT

## 2025-03-31 PROCEDURE — 80053 COMPREHEN METABOLIC PANEL: CPT

## 2025-03-31 PROCEDURE — 86357 NK CELLS TOTAL COUNT: CPT

## 2025-03-31 PROCEDURE — 99214 OFFICE O/P EST MOD 30 MIN: CPT

## 2025-03-31 PROCEDURE — 85025 COMPLETE CBC W/AUTO DIFF WBC: CPT

## 2025-03-31 PROCEDURE — 86665 EPSTEIN-BARR CAPSID VCA: CPT

## 2025-03-31 PROCEDURE — 86355 B CELLS TOTAL COUNT: CPT

## 2025-04-01 DIAGNOSIS — D64.9 ANEMIA, UNSPECIFIED: ICD-10-CM

## 2025-04-01 PROBLEM — R89.9 ABNORMAL LABORATORY TEST RESULT: Status: ACTIVE | Noted: 2025-04-01

## 2025-04-02 LAB
CMV IGM SERPL QL: <8 AU/ML
CMV IGM SERPL QL: NEGATIVE
EBV EA AB SER IA-ACNC: >150 U/ML
EBV EA AB TITR SER IF: POSITIVE
EBV EA IGG SER QL IA: >600 U/ML
EBV EA IGG SER-ACNC: POSITIVE
EBV EA IGM SER IA-ACNC: NEGATIVE
EBV PATRN SPEC IB-IMP: NORMAL
EBV VCA IGG SER IA-ACNC: >750 U/ML
EBV VCA IGM SER QL IA: <10 U/ML
EPSTEIN-BARR VIRUS CAPSID ANTIGEN IGG: POSITIVE
FERRITIN SERPL-MCNC: 130 NG/ML
IGG SER QL IEP: 849 MG/DL

## 2025-04-03 ENCOUNTER — APPOINTMENT (OUTPATIENT)
Dept: PULMONOLOGY | Facility: HOSPITAL | Age: 70
End: 2025-04-03
Payer: MEDICARE

## 2025-04-03 ENCOUNTER — OUTPATIENT (OUTPATIENT)
Dept: OUTPATIENT SERVICES | Facility: HOSPITAL | Age: 70
LOS: 1 days | End: 2025-04-03
Payer: MEDICARE

## 2025-04-03 DIAGNOSIS — R06.02 SHORTNESS OF BREATH: ICD-10-CM

## 2025-04-03 PROCEDURE — 94729 DIFFUSING CAPACITY: CPT

## 2025-04-03 PROCEDURE — 94727 GAS DIL/WSHOT DETER LNG VOL: CPT | Mod: 26

## 2025-04-03 PROCEDURE — 94070 EVALUATION OF WHEEZING: CPT

## 2025-04-03 PROCEDURE — 94729 DIFFUSING CAPACITY: CPT | Mod: 26

## 2025-04-03 PROCEDURE — 94664 DEMO&/EVAL PT USE INHALER: CPT

## 2025-04-03 PROCEDURE — 94060 EVALUATION OF WHEEZING: CPT | Mod: 26

## 2025-04-03 PROCEDURE — 94726 PLETHYSMOGRAPHY LUNG VOLUMES: CPT

## 2025-04-04 DIAGNOSIS — R06.02 SHORTNESS OF BREATH: ICD-10-CM

## 2025-04-11 ENCOUNTER — APPOINTMENT (OUTPATIENT)
Dept: UROLOGY | Facility: CLINIC | Age: 70
End: 2025-04-11

## 2025-06-30 ENCOUNTER — LABORATORY RESULT (OUTPATIENT)
Age: 70
End: 2025-06-30

## 2025-06-30 ENCOUNTER — APPOINTMENT (OUTPATIENT)
Age: 70
End: 2025-06-30
Payer: MEDICARE

## 2025-06-30 VITALS
SYSTOLIC BLOOD PRESSURE: 128 MMHG | BODY MASS INDEX: 30.8 KG/M2 | HEIGHT: 71 IN | WEIGHT: 220 LBS | TEMPERATURE: 98.6 F | HEART RATE: 98 BPM | DIASTOLIC BLOOD PRESSURE: 76 MMHG | OXYGEN SATURATION: 99 %

## 2025-06-30 LAB
ALBUMIN SERPL ELPH-MCNC: 4.1 G/DL
ALP BLD-CCNC: 152 U/L
ALT SERPL-CCNC: 14 U/L
ANION GAP SERPL CALC-SCNC: 14 MMOL/L
AST SERPL-CCNC: 21 U/L
AUTO BASOPHILS #: 0.02 K/UL
AUTO BASOPHILS %: 0.3 %
AUTO EOSINOPHILS #: 0 K/UL
AUTO EOSINOPHILS %: 0 %
AUTO IMMATURE GRANULOCYTES #: 0.06 K/UL
AUTO LYMPHOCYTES #: 2.64 K/UL
AUTO LYMPHOCYTES %: 39.2 %
AUTO MONOCYTES #: 0.62 K/UL
AUTO MONOCYTES %: 9.2 %
AUTO NEUTROPHILS #: 3.4 K/UL
AUTO NEUTROPHILS %: 50.4 %
AUTO NRBC #: 0 K/UL
BILIRUB SERPL-MCNC: 0.8 MG/DL
BUN SERPL-MCNC: 9 MG/DL
CALCIUM SERPL-MCNC: 8.8 MG/DL
CHLORIDE SERPL-SCNC: 105 MMOL/L
CO2 SERPL-SCNC: 24 MMOL/L
CREAT SERPL-MCNC: 1.2 MG/DL
EGFRCR SERPLBLD CKD-EPI 2021: 65 ML/MIN/1.73M2
GLUCOSE SERPL-MCNC: 95 MG/DL
HCT VFR BLD CALC: 36.2 %
HGB BLD-MCNC: 12 G/DL
IMM GRANULOCYTES NFR BLD AUTO: 0.9 %
LDH SERPL-CCNC: 186 U/L
MAN DIFF?: NORMAL
MCHC RBC-ENTMCNC: 27.3 PG
MCHC RBC-ENTMCNC: 33.1 G/DL
MCV RBC AUTO: 82.3 FL
PLATELET # BLD AUTO: 269 K/UL
PMV BLD AUTO: 0 /100 WBCS
PMV BLD: 9.1 FL
POTASSIUM SERPL-SCNC: 4.4 MMOL/L
PROT SERPL-MCNC: 6.3 G/DL
RBC # BLD: 4.4 M/UL
RBC # FLD: 14.2 %
SODIUM SERPL-SCNC: 143 MMOL/L
WBC # FLD AUTO: 6.74 K/UL

## 2025-06-30 PROCEDURE — 88189 FLOWCYTOMETRY/READ 16 & >: CPT | Mod: 59

## 2025-06-30 PROCEDURE — 99214 OFFICE O/P EST MOD 30 MIN: CPT

## 2025-08-07 ENCOUNTER — APPOINTMENT (OUTPATIENT)
Dept: PULMONOLOGY | Facility: CLINIC | Age: 70
End: 2025-08-07